# Patient Record
Sex: FEMALE | Race: BLACK OR AFRICAN AMERICAN | NOT HISPANIC OR LATINO | URBAN - METROPOLITAN AREA
[De-identification: names, ages, dates, MRNs, and addresses within clinical notes are randomized per-mention and may not be internally consistent; named-entity substitution may affect disease eponyms.]

---

## 2017-07-17 ENCOUNTER — OUTPATIENT (OUTPATIENT)
Dept: OUTPATIENT SERVICES | Facility: HOSPITAL | Age: 67
LOS: 1 days | End: 2017-07-17
Payer: MEDICARE

## 2017-07-17 PROCEDURE — 73600 X-RAY EXAM OF ANKLE: CPT | Mod: 26,50

## 2017-07-17 PROCEDURE — 73600 X-RAY EXAM OF ANKLE: CPT

## 2017-07-17 PROCEDURE — 73630 X-RAY EXAM OF FOOT: CPT

## 2017-07-17 PROCEDURE — 73630 X-RAY EXAM OF FOOT: CPT | Mod: 26,50

## 2017-09-08 ENCOUNTER — OUTPATIENT (OUTPATIENT)
Dept: OUTPATIENT SERVICES | Facility: HOSPITAL | Age: 67
LOS: 1 days | Discharge: HOME | End: 2017-09-08

## 2017-09-08 DIAGNOSIS — M25.774 OSTEOPHYTE, RIGHT FOOT: ICD-10-CM

## 2017-09-08 DIAGNOSIS — Z79.899 OTHER LONG TERM (CURRENT) DRUG THERAPY: ICD-10-CM

## 2017-09-08 DIAGNOSIS — E11.9 TYPE 2 DIABETES MELLITUS WITHOUT COMPLICATIONS: ICD-10-CM

## 2017-09-08 DIAGNOSIS — I10 ESSENTIAL (PRIMARY) HYPERTENSION: ICD-10-CM

## 2017-09-08 DIAGNOSIS — M21.6X1 OTHER ACQUIRED DEFORMITIES OF RIGHT FOOT: ICD-10-CM

## 2017-09-08 DIAGNOSIS — M76.61 ACHILLES TENDINITIS, RIGHT LEG: ICD-10-CM

## 2018-02-05 ENCOUNTER — APPOINTMENT (OUTPATIENT)
Dept: VASCULAR SURGERY | Facility: CLINIC | Age: 68
End: 2018-02-05
Payer: MEDICARE

## 2018-02-05 PROCEDURE — 93970 EXTREMITY STUDY: CPT

## 2018-05-18 ENCOUNTER — APPOINTMENT (OUTPATIENT)
Dept: VASCULAR SURGERY | Facility: CLINIC | Age: 68
End: 2018-05-18
Payer: MEDICARE

## 2018-05-18 VITALS
WEIGHT: 205 LBS | BODY MASS INDEX: 31.07 KG/M2 | SYSTOLIC BLOOD PRESSURE: 131 MMHG | HEART RATE: 59 BPM | OXYGEN SATURATION: 99 % | DIASTOLIC BLOOD PRESSURE: 72 MMHG | HEIGHT: 68 IN

## 2018-05-18 DIAGNOSIS — Q82.0 HEREDITARY LYMPHEDEMA: ICD-10-CM

## 2018-05-18 DIAGNOSIS — Z86.39 PERSONAL HISTORY OF OTHER ENDOCRINE, NUTRITIONAL AND METABOLIC DISEASE: ICD-10-CM

## 2018-05-18 DIAGNOSIS — Z86.79 PERSONAL HISTORY OF OTHER DISEASES OF THE CIRCULATORY SYSTEM: ICD-10-CM

## 2018-05-18 DIAGNOSIS — Z78.9 OTHER SPECIFIED HEALTH STATUS: ICD-10-CM

## 2018-05-18 DIAGNOSIS — R60.0 LOCALIZED EDEMA: ICD-10-CM

## 2018-05-18 PROCEDURE — 99203 OFFICE O/P NEW LOW 30 MIN: CPT

## 2018-05-18 PROCEDURE — 93880 EXTRACRANIAL BILAT STUDY: CPT

## 2018-06-01 PROBLEM — Q82.0 LYMPHEDEMA, HEREDITARY: Status: ACTIVE | Noted: 2018-06-01

## 2018-08-27 ENCOUNTER — OUTPATIENT (OUTPATIENT)
Dept: OUTPATIENT SERVICES | Facility: HOSPITAL | Age: 68
LOS: 1 days | End: 2018-08-27
Payer: MEDICARE

## 2018-08-27 ENCOUNTER — APPOINTMENT (OUTPATIENT)
Dept: VASCULAR SURGERY | Facility: CLINIC | Age: 68
End: 2018-08-27
Payer: MEDICARE

## 2018-08-27 PROCEDURE — 73564 X-RAY EXAM KNEE 4 OR MORE: CPT | Mod: 26,RT

## 2018-08-27 PROCEDURE — 73564 X-RAY EXAM KNEE 4 OR MORE: CPT

## 2018-08-27 PROCEDURE — 93971 EXTREMITY STUDY: CPT

## 2018-09-03 PROBLEM — R60.0 BILATERAL EDEMA OF LOWER EXTREMITY: Status: ACTIVE | Noted: 2018-05-18

## 2018-11-16 ENCOUNTER — APPOINTMENT (OUTPATIENT)
Dept: VASCULAR SURGERY | Facility: CLINIC | Age: 68
End: 2018-11-16
Payer: MEDICARE

## 2018-11-16 PROCEDURE — 99212 OFFICE O/P EST SF 10 MIN: CPT

## 2019-01-04 ENCOUNTER — APPOINTMENT (OUTPATIENT)
Dept: OBGYN | Facility: CLINIC | Age: 69
End: 2019-01-04
Payer: MEDICARE

## 2019-01-04 ENCOUNTER — RESULT REVIEW (OUTPATIENT)
Age: 69
End: 2019-01-04

## 2019-01-04 PROCEDURE — G0101: CPT

## 2019-05-07 ENCOUNTER — APPOINTMENT (OUTPATIENT)
Dept: VASCULAR SURGERY | Facility: CLINIC | Age: 69
End: 2019-05-07
Payer: MEDICARE

## 2019-05-07 ENCOUNTER — OUTPATIENT (OUTPATIENT)
Dept: OUTPATIENT SERVICES | Facility: HOSPITAL | Age: 69
LOS: 1 days | End: 2019-05-07
Payer: MEDICARE

## 2019-05-07 PROCEDURE — 93971 EXTREMITY STUDY: CPT

## 2019-05-07 PROCEDURE — 73564 X-RAY EXAM KNEE 4 OR MORE: CPT | Mod: 26,RT

## 2019-05-07 PROCEDURE — 73564 X-RAY EXAM KNEE 4 OR MORE: CPT

## 2019-06-17 ENCOUNTER — EMERGENCY (EMERGENCY)
Facility: HOSPITAL | Age: 69
LOS: 1 days | Discharge: HOME | End: 2019-06-17
Admitting: EMERGENCY MEDICINE
Payer: MEDICARE

## 2019-06-17 VITALS
SYSTOLIC BLOOD PRESSURE: 155 MMHG | OXYGEN SATURATION: 97 % | DIASTOLIC BLOOD PRESSURE: 77 MMHG | RESPIRATION RATE: 16 BRPM | TEMPERATURE: 98 F | HEART RATE: 78 BPM
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
## 2019-06-17 DIAGNOSIS — Z79.84 LONG TERM (CURRENT) USE OF ORAL HYPOGLYCEMIC DRUGS: ICD-10-CM
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
## 2019-06-17 DIAGNOSIS — E11.9 TYPE 2 DIABETES MELLITUS WITHOUT COMPLICATIONS: ICD-10-CM

## 2019-06-17 DIAGNOSIS — I10 ESSENTIAL (PRIMARY) HYPERTENSION: ICD-10-CM
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
## 2019-06-17 DIAGNOSIS — Z79.899 OTHER LONG TERM (CURRENT) DRUG THERAPY: ICD-10-CM
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
## 2019-06-17 DIAGNOSIS — S00.262A INSECT BITE (NONVENOMOUS) OF LEFT EYELID AND PERIOCULAR AREA, INITIAL ENCOUNTER: ICD-10-CM

## 2019-06-17 DIAGNOSIS — S80.861A INSECT BITE (NONVENOMOUS), RIGHT LOWER LEG, INITIAL ENCOUNTER: ICD-10-CM

## 2019-06-17 PROCEDURE — 99283 EMERGENCY DEPT VISIT LOW MDM: CPT

## 2019-06-17 RX ORDER — CEPHALEXIN 500 MG
1 CAPSULE ORAL
Qty: 20 | Refills: 0
Start: 2019-06-17 | End: 2019-06-26

## 2019-06-17 RX ORDER — FEXOFENADINE HCL 30 MG
1 TABLET ORAL
Qty: 10 | Refills: 0
Start: 2019-06-17

## 2019-06-17 NOTE — ED PROVIDER NOTE - OBJECTIVE STATEMENT
patient with Hx CAD, DM, HTN c/o insect bites after attending a barbeque 2 days ago; says she thinks they were mosquitos.  Affected areas left eyebrow, right lower leg.  Very itchy, not painful.  Left eyelid became swollen yesterday, but improving today.  Took benadryl last night with some relief.      PMHX DM HTN CAD  PSHx CABG

## 2019-06-17 NOTE — ED ADULT TRIAGE NOTE - CHIEF COMPLAINT QUOTE
Patient states "I was bitten by a mosquito on Saturday at a BBQ. Now my left eye and right leg is swollen."

## 2019-06-17 NOTE — ED ADULT NURSE NOTE - OBJECTIVE STATEMENT
Patient presents with swollen left eye lid and red mosquito bite to right shin. Patient states she was at a BBQ on Saturday and "I was bit by mosquitos." Patient tried using benadryl and states the swelling was not relieved.

## 2019-06-17 NOTE — ED PROVIDER NOTE - CLINICAL SUMMARY MEDICAL DECISION MAKING FREE TEXT BOX
67 yo fem DM, CAD c/o insect bites to left periorbital region and right lower leg after BBQ 2 days ago.  No change in vision or diplopia.  Mild left periorbital swelling: no pain with EOM, no warmth or significant tenderness; do not suspect orbital cellulitis.  Will Rx abx due to hx of DM.  Also patch of mild erythema right lateral lower leg without tenderness/warmth, she says this is also an insect bite.  Plan: PO abx and nonsedating antihistamine.  F/U Dr Sanchez in 1-2 days.

## 2019-06-17 NOTE — ED ADULT NURSE NOTE - PMH
Coronary artery disease involving other coronary artery bypass graft with angina pectoris    Diabetes type 2, controlled    HTN (hypertension), benign

## 2019-06-17 NOTE — ED PROVIDER NOTE - PHYSICAL EXAMINATION
GEN: alert NAD  HEENT: mild left periorbital puffiness, no redness/tenderness/warmth.  PERRL. EOMI without pain.  Conjunctiva clear.  AC clear.  Nasal mucosa clear.  Throat clear without redness or sswelling.  PULM: lungs CTAB  CV: RRR S1S2  MSK: No LE edema or calf tenderness  SKIN: patch of mild erythema 5 x 5 cm to right lateral calf.

## 2019-06-17 NOTE — ED PROVIDER NOTE - ENMT NEGATIVE STATEMENT, MLM
Nose: no nasal congestion and no nasal drainage. Mouth/Throat: no dysphagia, no hoarseness and no throat pain.

## 2019-06-17 NOTE — ED PROVIDER NOTE - NSFOLLOWUPINSTRUCTIONS_ED_ALL_ED_FT
Take Allegra (nonsedating antihistamine) and cephalexin (antibiotic) as prescribed.   Follow up  with DR Sanchez on 1-2 days.  Return to the Emergency Department if you have any new or worsening symptoms, or if you have any concerns.  ________________________________________    INSECT BITE OR STING - AfterCare(R) Instructions(ER/ED)     Insect Bite or Sting    WHAT YOU NEED TO KNOW:    Most insect bites and stings are not dangerous and go away without treatment. Your symptoms may be mild, or you may develop anaphylaxis. Anaphylaxis is a sudden, life-threatening reaction that needs immediate treatment. Common examples of insects that bite or sting are bees, ticks, mosquitoes, spiders, and ants. Insect bites or stings can lead to diseases such as malaria, West Nile virus, Lyme disease, or [place] Spotted Fever.    DISCHARGE INSTRUCTIONS:    Call 911 for signs or symptoms of anaphylaxis, such as trouble breathing, swelling in your mouth or throat, or wheezing. You may also have itching, a rash, hives, or feel like you are going to faint.    Return to the emergency department if:     You are stung on your tongue or in your throat.      A white area forms around the bite.      You are sweating badly or have body pain.      You think you were bitten or stung by a poisonous insect.    Contact your healthcare provider if:     You have a fever.      The area becomes red, warm, tender, and swollen beyond the area of the bite or sting.      You have questions or concerns about your condition or care.    Medicines:     Antihistamines decrease itching and rash.       Epinephrine is used to treat severe allergic reactions such as anaphylaxis.       Take your medicine as directed. Contact your healthcare provider if you think your medicine is not helping or if you have side effects. Tell him of her if you are allergic to any medicine. Keep a list of the medicines, vitamins, and herbs you take. Include the amounts, and when and why you take them. Bring the list or the pill bottles to follow-up visits. Carry your medicine list with you in case of an emergency.    Steps to take for signs or symptoms of anaphylaxis:     Immediately give 1 shot of epinephrine only into the outer thigh muscle.       Leave the shot in place as directed. Your healthcare provider may recommend you leave it in place for up to 10 seconds before you remove it. This helps make sure all of the epinephrine is delivered.       Call 911 and go to the emergency department, even if the shot improved symptoms. Do not drive yourself. Bring the used epinephrine shot with you.     Safety precautions to take if you are at risk for anaphylaxis:     Keep 2 shots of epinephrine with you at all times. You may need a second shot, because epinephrine only works for about 20 minutes and symptoms may return. Your healthcare provider can show you and family members how to give the shot. Check the expiration date every month and replace it before it expires.      Create an action plan. Your healthcare provider can help you create a written plan that explains the allergy and an emergency plan to treat a reaction. The plan explains when to give a second epinephrine shot if symptoms return or do not improve after the first. Give copies of the action plan and emergency instructions to family members, work and school staff, and  providers. Show them how to give a shot of epinephrine.      Carry medical alert identification. Wear medical alert jewelry or carry a card that says you have an insect allergy. Ask your healthcare provider where to get these items. Medical Alert Jewelry         If an insect bites or stings you:     Remove the stinger. Scrape the stinger out with your fingernail, edge of a credit card, or a knife blade. Do not squeeze the wound. Gently wash the area with soap and water.      Remove the tick. Ticks must be removed as soon as possible so you do not get diseases passed through tick bites. Ask your healthcare provider for more information on tick bites and how to remove ticks.    Care for a bite or sting wound:     Elevate the affected area. Prop the wound above the level of your heart, if possible. Elevate the area for 10 to 20 minutes each hour or as directed by your healthcare provider.      Use compresses. Soak a clean washcloth in cold water, wring it out, and put it on the bite or sting. Use the compress for 10 to 20 minutes each hour or as directed by your healthcare provider. After 24 to 48 hours, change to warm compresses.       Apply a paste. Add water to baking soda to make a thick paste. Put the paste on the area for 5 minutes. Rinse gently to remove the paste.     Prevent another insect bite or sting:     Do not wear bright-colored or flower-print clothing when you plan to spend time outdoors. Do not use hairspray, perfumes, or aftershave.      Do not leave food out.      Empty any standing water and wash container with soap and water every 2 days.      Put screens on all open windows and doors.      Put insect repellent that contains DEET on skin that is showing when you go outside. Put insect repellent at the top of your boots, bottom of pant legs, and sleeve cuffs. Wear long sleeves, pants, and shoes.      Use citronella candles outdoors to help keep mosquitoes away. Put a tick and flea collar on pets.    Follow up with your healthcare provider as directed: Write down your questions so you remember to ask them during your visits.

## 2019-12-26 ENCOUNTER — EMERGENCY (EMERGENCY)
Facility: HOSPITAL | Age: 69
LOS: 1 days | Discharge: HOME | End: 2019-12-26
Attending: EMERGENCY MEDICINE | Admitting: EMERGENCY MEDICINE
Payer: MEDICARE

## 2019-12-26 VITALS
TEMPERATURE: 97 F | HEART RATE: 59 BPM | RESPIRATION RATE: 18 BRPM | SYSTOLIC BLOOD PRESSURE: 149 MMHG | DIASTOLIC BLOOD PRESSURE: 89 MMHG | OXYGEN SATURATION: 100 %

## 2019-12-26 VITALS
TEMPERATURE: 98 F | DIASTOLIC BLOOD PRESSURE: 68 MMHG | OXYGEN SATURATION: 99 % | RESPIRATION RATE: 18 BRPM | SYSTOLIC BLOOD PRESSURE: 126 MMHG | HEART RATE: 60 BPM

## 2019-12-26 DIAGNOSIS — M54.6 PAIN IN THORACIC SPINE: ICD-10-CM

## 2019-12-26 DIAGNOSIS — I10 ESSENTIAL (PRIMARY) HYPERTENSION: ICD-10-CM

## 2019-12-26 DIAGNOSIS — M54.9 DORSALGIA, UNSPECIFIED: ICD-10-CM

## 2019-12-26 DIAGNOSIS — Z79.899 OTHER LONG TERM (CURRENT) DRUG THERAPY: ICD-10-CM

## 2019-12-26 LAB
ALBUMIN SERPL ELPH-MCNC: 4.1 G/DL — SIGNIFICANT CHANGE UP (ref 3.3–5)
ALP SERPL-CCNC: SIGNIFICANT CHANGE UP U/L (ref 40–120)
ALT FLD-CCNC: SIGNIFICANT CHANGE UP U/L (ref 10–45)
ANION GAP SERPL CALC-SCNC: 10 MMOL/L — SIGNIFICANT CHANGE UP (ref 5–17)
AST SERPL-CCNC: SIGNIFICANT CHANGE UP U/L (ref 10–40)
BILIRUB SERPL-MCNC: 0.5 MG/DL — SIGNIFICANT CHANGE UP (ref 0.2–1.2)
BUN SERPL-MCNC: 16 MG/DL — SIGNIFICANT CHANGE UP (ref 7–23)
CALCIUM SERPL-MCNC: 9.9 MG/DL — SIGNIFICANT CHANGE UP (ref 8.4–10.5)
CHLORIDE SERPL-SCNC: 105 MMOL/L — SIGNIFICANT CHANGE UP (ref 96–108)
CO2 SERPL-SCNC: 25 MMOL/L — SIGNIFICANT CHANGE UP (ref 22–31)
CREAT SERPL-MCNC: 0.84 MG/DL — SIGNIFICANT CHANGE UP (ref 0.5–1.3)
GLUCOSE SERPL-MCNC: 92 MG/DL — SIGNIFICANT CHANGE UP (ref 70–99)
HCT VFR BLD CALC: 42.5 % — SIGNIFICANT CHANGE UP (ref 34.5–45)
HGB BLD-MCNC: 13.1 G/DL — SIGNIFICANT CHANGE UP (ref 11.5–15.5)
MCHC RBC-ENTMCNC: 28.4 PG — SIGNIFICANT CHANGE UP (ref 27–34)
MCHC RBC-ENTMCNC: 30.8 GM/DL — LOW (ref 32–36)
MCV RBC AUTO: 92 FL — SIGNIFICANT CHANGE UP (ref 80–100)
NRBC # BLD: 0 /100 WBCS — SIGNIFICANT CHANGE UP (ref 0–0)
PLATELET # BLD AUTO: 186 K/UL — SIGNIFICANT CHANGE UP (ref 150–400)
POTASSIUM SERPL-MCNC: SIGNIFICANT CHANGE UP MMOL/L (ref 3.5–5.3)
POTASSIUM SERPL-SCNC: SIGNIFICANT CHANGE UP MMOL/L (ref 3.5–5.3)
PROT SERPL-MCNC: 8.1 G/DL — SIGNIFICANT CHANGE UP (ref 6–8.3)
RBC # BLD: 4.62 M/UL — SIGNIFICANT CHANGE UP (ref 3.8–5.2)
RBC # FLD: 14.2 % — SIGNIFICANT CHANGE UP (ref 10.3–14.5)
SODIUM SERPL-SCNC: 140 MMOL/L — SIGNIFICANT CHANGE UP (ref 135–145)
WBC # BLD: 6.21 K/UL — SIGNIFICANT CHANGE UP (ref 3.8–10.5)
WBC # FLD AUTO: 6.21 K/UL — SIGNIFICANT CHANGE UP (ref 3.8–10.5)

## 2019-12-26 PROCEDURE — 80053 COMPREHEN METABOLIC PANEL: CPT

## 2019-12-26 PROCEDURE — 74177 CT ABD & PELVIS W/CONTRAST: CPT

## 2019-12-26 PROCEDURE — 99284 EMERGENCY DEPT VISIT MOD MDM: CPT

## 2019-12-26 PROCEDURE — 96376 TX/PRO/DX INJ SAME DRUG ADON: CPT

## 2019-12-26 PROCEDURE — 96374 THER/PROPH/DIAG INJ IV PUSH: CPT | Mod: XU

## 2019-12-26 PROCEDURE — 74177 CT ABD & PELVIS W/CONTRAST: CPT | Mod: 26

## 2019-12-26 PROCEDURE — 85027 COMPLETE CBC AUTOMATED: CPT

## 2019-12-26 PROCEDURE — 36415 COLL VENOUS BLD VENIPUNCTURE: CPT

## 2019-12-26 PROCEDURE — 99284 EMERGENCY DEPT VISIT MOD MDM: CPT | Mod: 25

## 2019-12-26 RX ORDER — DIAZEPAM 5 MG
1 TABLET ORAL
Qty: 8 | Refills: 0
Start: 2019-12-26

## 2019-12-26 RX ORDER — KETOROLAC TROMETHAMINE 30 MG/ML
15 SYRINGE (ML) INJECTION ONCE
Refills: 0 | Status: DISCONTINUED | OUTPATIENT
Start: 2019-12-26 | End: 2019-12-26

## 2019-12-26 RX ORDER — ACETAMINOPHEN 500 MG
2 TABLET ORAL
Qty: 30 | Refills: 0
Start: 2019-12-26

## 2019-12-26 RX ORDER — DIAZEPAM 5 MG
5 TABLET ORAL ONCE
Refills: 0 | Status: DISCONTINUED | OUTPATIENT
Start: 2019-12-26 | End: 2019-12-26

## 2019-12-26 RX ADMIN — Medication 5 MILLIGRAM(S): at 17:49

## 2019-12-26 RX ADMIN — Medication 15 MILLIGRAM(S): at 15:30

## 2019-12-26 RX ADMIN — Medication 15 MILLIGRAM(S): at 18:04

## 2019-12-26 RX ADMIN — Medication 15 MILLIGRAM(S): at 15:15

## 2019-12-26 RX ADMIN — Medication 15 MILLIGRAM(S): at 17:49

## 2019-12-26 NOTE — ED PROVIDER NOTE - CLINICAL SUMMARY MEDICAL DECISION MAKING FREE TEXT BOX
69y F with right sided back pain radiating to abdomen. Given her age plan to obtain labs, CT and reassess.

## 2019-12-26 NOTE — ED PROVIDER NOTE - NSFOLLOWUPINSTRUCTIONS_ED_ALL_ED_FT
Back Exercises  The following exercises strengthen the muscles that help to support the back. They also help to keep the lower back flexible. Doing these exercises can help to prevent back pain or lessen existing pain.  If you have back pain or discomfort, try doing these exercises 2–3 times each day or as told by your health care provider. When the pain goes away, do them once each day, but increase the number of times that you repeat the steps for each exercise (do more repetitions). If you do not have back pain or discomfort, do these exercises once each day or as told by your health care provider.  Exercises  Single Knee to Chest     Repeat these steps 3–5 times for each leg:  Lie on your back on a firm bed or the floor with your legs extended.  Bring one knee to your chest. Your other leg should stay extended and in contact with the floor.  Hold your knee in place by grabbing your knee or thigh.  Pull on your knee until you feel a gentle stretch in your lower back.  Hold the stretch for 10–30 seconds.  Slowly release and straighten your leg.  Pelvic Tilt    Repeat these steps 5–10 times:  Lie on your back on a firm bed or the floor with your legs extended.  Bend your knees so they are pointing toward the ceiling and your feet are flat on the floor.  Tighten your lower abdominal muscles to press your lower back against the floor. This motion will tilt your pelvis so your tailbone points up toward the ceiling instead of pointing to your feet or the floor.  With gentle tension and even breathing, hold this position for 5–10 seconds.  Cat-Cow    ImageRepeat these steps until your lower back becomes more flexible:  Get into a hands-and-knees position on a firm surface. Keep your hands under your shoulders, and keep your knees under your hips. You may place padding under your knees for comfort.  Let your head hang down, and point your tailbone toward the floor so your lower back becomes rounded like the back of a cat.  Hold this position for 5 seconds.  Slowly lift your head and point your tailbone up toward the ceiling so your back forms a sagging arch like the back of a cow.  Hold this position for 5 seconds.  Press-Ups    ImageRepeat these steps 5–10 times:  Lie on your abdomen (face-down) on the floor.  Place your palms near your head, about shoulder-width apart.  While you keep your back as relaxed as possible and keep your hips on the floor, slowly straighten your arms to raise the top half of your body and lift your shoulders. Do not use your back muscles to raise your upper torso. You may adjust the placement of your hands to make yourself more comfortable.  Hold this position for 5 seconds while you keep your back relaxed.  Slowly return to lying flat on the floor.  Bridges    ImageRepeat these steps 10 times:  Lie on your back on a firm surface.  Bend your knees so they are pointing toward the ceiling and your feet are flat on the floor.  Tighten your buttocks muscles and lift your buttocks off of the floor until your waist is at almost the same height as your knees. You should feel the muscles working in your buttocks and the back of your thighs. If you do not feel these muscles, slide your feet 1–2 inches farther away from your buttocks.  Hold this position for 3–5 seconds.  Slowly lower your hips to the starting position, and allow your buttocks muscles to relax completely.  If this exercise is too easy, try doing it with your arms crossed over your chest.  Abdominal Crunches    Repeat these steps 5–10 times:  Lie on your back on a firm bed or the floor with your legs extended.  Bend your knees so they are pointing toward the ceiling and your feet are flat on the floor.  Cross your arms over your chest.  Tip your chin slightly toward your chest without bending your neck.  Tighten your abdominal muscles and slowly raise your trunk (torso) high enough to lift your shoulder blades a tiny bit off of the floor. Avoid raising your torso higher than that, because it can put too much stress on your low back and it does not help to strengthen your abdominal muscles.  Slowly return to your starting position.  Back Lifts    Repeat these steps 5–10 times:  Lie on your abdomen (face-down) with your arms at your sides, and rest your forehead on the floor.  Tighten the muscles in your legs and your buttocks.  Slowly lift your chest off of the floor while you keep your hips pressed to the floor. Keep the back of your head in line with the curve in your back. Your eyes should be looking at the floor.  Hold this position for 3–5 seconds.  Slowly return to your starting position.  Contact a health care provider if:  Your back pain or discomfort gets much worse when you do an exercise.Your back pain or discomfort does not lessen within 2 hours after you exercise.If you have any of these problems, stop doing these exercises right away. Do not do them again unless your health care provider says that you can.  Get help right away if:  You develop sudden, severe back pain. If this happens, stop doing the exercises right away. Do not do them again unless your health care provider says that you can.This information is not intended to replace advice given to you by your health care provider. Make sure you discuss any questions you have with your health care provider.    Back Pain    WHAT YOU NEED TO KNOW:    What do I need to know about back pain? Back pain is common. You may feel sore or stiff on one or both sides of your back. The pain may spread to your buttocks or thighs.    What causes or increases my risk for back pain? Conditions that affect the spine, joints, or muscles can cause back pain. These may include arthritis, spinal stenosis (narrowing of the spinal column), muscle tension, or breakdown of the spinal discs. The following increase your risk of back pain:     Repeated bending, lifting, or twisting, or lifting heavy items      Injury from a fall or accident      Lack of regular physical activity       Obesity or pregnancy       Smoking      Aging      Driving, sitting, or standing for long periods      Bad posture while sitting or standing    How is back pain diagnosed? Your healthcare provider will ask if you have any medical conditions. He or she may ask if you have a history of back pain and how it started. He or she may watch you stand and walk, and check your range of motion. Show him or her where you feel pain and what makes it better or worse. Describe the pain, how bad it is, and how long it lasts. Tell your provider if your pain worsens at night or when you lie on your back.    How is back pain treated?    NSAIDs help decrease swelling and pain. This medicine is available with or without a doctor's order. NSAIDs can cause stomach bleeding or kidney problems in certain people. If you take blood thinner medicine, always ask your healthcare provider if NSAIDs are safe for you. Always read the medicine label and follow directions.      Acetaminophen decreases pain and fever. It is available without a doctor's order. Ask how much to take and how often to take it. Follow directions. Read the labels of all other medicines you are using to see if they also contain acetaminophen, or ask your doctor or pharmacist. Acetaminophen can cause liver damage if not taken correctly. Do not use more than 4 grams (4,000 milligrams) total of acetaminophen in one day.       Muscle relaxers help decrease muscle spasms and back pain.      Prescription pain medicine may be given. Ask your healthcare provider how to take this medicine safely. Some prescription pain medicines contain acetaminophen. Do not take other medicines that contain acetaminophen without talking to your healthcare provider. Too much acetaminophen may cause liver damage. Prescription pain medicine may cause constipation. Ask your healthcare provider how to prevent or treat constipation.     How do I manage my back pain?     Apply ice on your back for 15 to 20 minutes every hour or as directed. Use an ice pack, or put crushed ice in a plastic bag. Cover it with a towel before you apply it to your skin. Ice helps prevent tissue damage and decreases pain.      Apply heat on your back for 20 to 30 minutes every 2 hours for as many days as directed. Heat helps decrease pain and muscle spasms.      Stay active as much as you can without causing more pain. Bed rest could make your back pain worse. Avoid heavy lifting until your pain is gone.      Go to physical therapy as directed. A physical therapist can teach you exercises to help improve movement and strength, and to decrease pain.    When should I seek immediate care?     You have pain, numbness, or weakness in one or both legs.      Your pain becomes so severe that you cannot walk.      You cannot control your urine or bowel movements.      You have severe back pain with chest pain.      You have severe back pain, nausea, and vomiting.      You have severe back pain that spreads to your side or genital area.    When should I contact my healthcare provider?     You have back pain that does not get better with rest and pain medicine.      You have a fever.      You have pain that worsens when you are on your back or when you rest.      You have pain that worsens when you cough or sneeze.      You lose weight without trying.      You have questions or concerns about your condition or care.    CARE AGREEMENT:    You have the right to help plan your care. Learn about your health condition and how it may be treated. Discuss treatment options with your healthcare providers to decide what care you want to receive. You always have the right to refuse treatment.        © Copyright [employer] 2019       back to top                      © Copyright [employer] 2019

## 2019-12-26 NOTE — ED ADULT TRIAGE NOTE - CHIEF COMPLAINT QUOTE
Patient complaining of right sided back pain, with radiation to abdomen for two weeks.  Patient denies any falls, injury, urinary symptoms, N/V/D, SOB or any other complaints at this time.

## 2019-12-26 NOTE — ED PROVIDER NOTE - OBJECTIVE STATEMENT
69y F with a history of HTN and triple bypass presents to the ED with complains of right sided back pain radiating to the abdomen 1x week. Patient notes taking Baclofen. Denies shortness of breath, fever, chills, dysuria, hematuria, trauma, leg weakness, or numbness. Patient is concerned symptoms are related to cancer but no family history of cancer.

## 2019-12-26 NOTE — ED ADULT NURSE NOTE - INTERVENTIONS DEFINITIONS
Stretcher in lowest position, wheels locked, appropriate side rails in place/Instruct patient to call for assistance/Monitor gait and stability/Physically safe environment: no spills, clutter or unnecessary equipment

## 2019-12-26 NOTE — ED PROVIDER NOTE - PATIENT PORTAL LINK FT
You can access the FollowMyHealth Patient Portal offered by [facility] by registering at the following website: [email or web address]. By joining [employer]’s FollowMyHealth portal, you will also be able to view your health information using other applications (apps) compatible with our system.

## 2019-12-26 NOTE — ED ADULT NURSE NOTE - OBJECTIVE STATEMENT
Pt. c/o L middle back pain radiating to epigastric x 1-2 weeks. Pt. reports L middle back pain on/off for "awhile" but has been constant x 2 weeks and epigastric pain constant x 1 week. Pain worsens w/ movement, last took baclofen last night. Denies abd pain, N&V&D, urinary Sx. Denies fever, chills, body aches. Denies recent injury/trauma to area, numbness/tingling in b/l LE. B/l ankle edema at baseline noted.

## 2019-12-26 NOTE — ED PROVIDER NOTE - MUSCULOSKELETAL, MLM
Spine appears normal, range of motion is not limited, no muscle or joint tenderness, reproducible pain right sided paraspinal region.

## 2019-12-26 NOTE — ED PROVIDER NOTE - PROGRESS NOTE DETAILS
improved after pain meds, potentially msk given improvement and no acute pathology on CT, no sob, cp, will rx meds on dc, fu with pmd, return for any worsening sx.

## 2020-01-15 ENCOUNTER — OUTPATIENT (OUTPATIENT)
Dept: OUTPATIENT SERVICES | Facility: HOSPITAL | Age: 70
LOS: 1 days | End: 2020-01-15
Payer: MEDICARE

## 2020-01-15 PROCEDURE — 78306 BONE IMAGING WHOLE BODY: CPT | Mod: 26

## 2020-01-15 PROCEDURE — 78830 RP LOCLZJ TUM SPECT W/CT 1: CPT | Mod: 26

## 2020-01-15 PROCEDURE — 78306 BONE IMAGING WHOLE BODY: CPT

## 2020-01-15 PROCEDURE — A9503: CPT

## 2020-01-21 ENCOUNTER — APPOINTMENT (OUTPATIENT)
Dept: MRI IMAGING | Facility: HOSPITAL | Age: 70
End: 2020-01-21

## 2020-01-21 ENCOUNTER — OUTPATIENT (OUTPATIENT)
Dept: OUTPATIENT SERVICES | Facility: HOSPITAL | Age: 70
LOS: 1 days | End: 2020-01-21
Payer: MEDICARE

## 2020-01-21 DIAGNOSIS — G95.9 DISEASE OF SPINAL CORD, UNSPECIFIED: ICD-10-CM

## 2020-01-21 PROCEDURE — 78830 RP LOCLZJ TUM SPECT W/CT 1: CPT

## 2020-01-21 PROCEDURE — 72148 MRI LUMBAR SPINE W/O DYE: CPT | Mod: 26

## 2020-01-21 PROCEDURE — 72148 MRI LUMBAR SPINE W/O DYE: CPT

## 2020-02-18 ENCOUNTER — APPOINTMENT (OUTPATIENT)
Dept: VASCULAR SURGERY | Facility: CLINIC | Age: 70
End: 2020-02-18
Payer: MEDICARE

## 2020-02-18 ENCOUNTER — OUTPATIENT (OUTPATIENT)
Dept: OUTPATIENT SERVICES | Facility: HOSPITAL | Age: 70
LOS: 1 days | End: 2020-02-18
Payer: MEDICARE

## 2020-02-18 ENCOUNTER — APPOINTMENT (OUTPATIENT)
Dept: MRI IMAGING | Facility: HOSPITAL | Age: 70
End: 2020-02-18
Payer: MEDICARE

## 2020-02-18 PROCEDURE — 72146 MRI CHEST SPINE W/O DYE: CPT | Mod: 26

## 2020-02-18 PROCEDURE — 72146 MRI CHEST SPINE W/O DYE: CPT

## 2020-02-18 PROCEDURE — 73502 X-RAY EXAM HIP UNI 2-3 VIEWS: CPT | Mod: 26,RT

## 2020-02-18 PROCEDURE — 73502 X-RAY EXAM HIP UNI 2-3 VIEWS: CPT

## 2020-02-18 PROCEDURE — 93971 EXTREMITY STUDY: CPT

## 2020-07-31 ENCOUNTER — APPOINTMENT (OUTPATIENT)
Dept: OBGYN | Facility: CLINIC | Age: 70
End: 2020-07-31
Payer: MEDICARE

## 2020-07-31 VITALS
SYSTOLIC BLOOD PRESSURE: 120 MMHG | DIASTOLIC BLOOD PRESSURE: 80 MMHG | BODY MASS INDEX: 29.1 KG/M2 | HEIGHT: 68 IN | WEIGHT: 192 LBS

## 2020-07-31 DIAGNOSIS — Z86.39 PERSONAL HISTORY OF OTHER ENDOCRINE, NUTRITIONAL AND METABOLIC DISEASE: ICD-10-CM

## 2020-07-31 PROCEDURE — G0101: CPT

## 2020-07-31 PROCEDURE — 99397 PER PM REEVAL EST PAT 65+ YR: CPT | Mod: GY

## 2020-07-31 PROCEDURE — 99212 OFFICE O/P EST SF 10 MIN: CPT | Mod: 25

## 2020-07-31 RX ORDER — NYSTATIN AND TRIAMCINOLONE ACETONIDE 100000; 1 MG/G; MG/G
100000-0.1 CREAM TOPICAL 3 TIMES DAILY
Qty: 42 | Refills: 0 | Status: ACTIVE | COMMUNITY
Start: 2020-07-31 | End: 1900-01-01

## 2020-07-31 RX ORDER — LOSARTAN POTASSIUM 100 MG/1
100 TABLET, FILM COATED ORAL
Refills: 0 | Status: ACTIVE | COMMUNITY

## 2020-07-31 RX ORDER — FUROSEMIDE 40 MG/1
40 TABLET ORAL
Refills: 0 | Status: ACTIVE | COMMUNITY

## 2020-07-31 RX ORDER — CLONIDINE HYDROCHLORIDE 0.3 MG/1
TABLET ORAL
Refills: 0 | Status: ACTIVE | COMMUNITY

## 2020-07-31 RX ORDER — ATORVASTATIN CALCIUM 40 MG/1
40 TABLET, FILM COATED ORAL
Refills: 0 | Status: ACTIVE | COMMUNITY

## 2020-07-31 RX ORDER — CANAGLIFLOZIN 100 MG/1
100 TABLET, FILM COATED ORAL
Refills: 0 | Status: ACTIVE | COMMUNITY

## 2020-07-31 RX ORDER — NIFEDIPINE 90 MG
90 TABLET, EXTENDED RELEASE ORAL
Refills: 0 | Status: ACTIVE | COMMUNITY

## 2020-07-31 RX ORDER — METFORMIN HYDROCHLORIDE 500 MG/1
500 TABLET, COATED ORAL
Refills: 0 | Status: ACTIVE | COMMUNITY

## 2020-07-31 RX ORDER — MULTIVIT-MIN/FA/LYCOPEN/LUTEIN .4-300-25
TABLET ORAL
Refills: 0 | Status: ACTIVE | COMMUNITY

## 2020-08-03 RX ORDER — CLOTRIMAZOLE AND BETAMETHASONE DIPROPIONATE 10; .5 MG/G; MG/G
1-0.05 CREAM TOPICAL 3 TIMES DAILY
Qty: 1 | Refills: 1 | Status: ACTIVE | COMMUNITY
Start: 2020-08-03 | End: 1900-01-01

## 2020-08-13 LAB — CYTOLOGY CVX/VAG DOC THIN PREP: NORMAL

## 2021-06-17 DIAGNOSIS — N63.0 UNSPECIFIED LUMP IN UNSPECIFIED BREAST: ICD-10-CM

## 2021-08-13 ENCOUNTER — NON-APPOINTMENT (OUTPATIENT)
Age: 71
End: 2021-08-13

## 2021-08-13 ENCOUNTER — APPOINTMENT (OUTPATIENT)
Dept: OBGYN | Facility: CLINIC | Age: 71
End: 2021-08-13
Payer: MEDICARE

## 2021-08-13 VITALS
SYSTOLIC BLOOD PRESSURE: 118 MMHG | HEIGHT: 68 IN | DIASTOLIC BLOOD PRESSURE: 75 MMHG | BODY MASS INDEX: 25.01 KG/M2 | WEIGHT: 165 LBS

## 2021-08-13 DIAGNOSIS — Z00.00 ENCOUNTER FOR GENERAL ADULT MEDICAL EXAMINATION W/OUT ABNORMAL FINDINGS: ICD-10-CM

## 2021-08-13 PROCEDURE — G0101: CPT

## 2021-08-13 NOTE — HISTORY OF PRESENT ILLNESS
[FreeTextEntry1] : SALIMA NAIR IS A 70 year OLD WHO PRESENTS FOR AN ANNUAL GYN EXAM.  SHE IS WITHOUT COMPLAINTS.\par

## 2021-08-13 NOTE — PHYSICAL EXAM
[Appropriately responsive] : appropriately responsive [Alert] : alert [No Acute Distress] : no acute distress [No Lymphadenopathy] : no lymphadenopathy [Regular Rate Rhythm] : regular rate rhythm [No Murmurs] : no murmurs [Clear to Auscultation B/L] : clear to auscultation bilaterally [Soft] : soft [Non-tender] : non-tender [Non-distended] : non-distended [No HSM] : No HSM [No Lesions] : no lesions [No Mass] : no mass [Oriented x3] : oriented x3 [Examination Of The Breasts] : a normal appearance [No Masses] : no breast masses were palpable [Labia Majora] : normal [Labia Minora] : normal [Atrophy] : atrophy [Normal] : normal [Absent] : absent [Uterine Adnexae] : normal [No Tenderness] : no tenderness [Nl Sphincter Tone] : normal sphincter tone [FreeTextEntry7] : OBESE; WELL HEALED SCAR

## 2021-11-30 ENCOUNTER — EMERGENCY (EMERGENCY)
Facility: HOSPITAL | Age: 71
LOS: 1 days | Discharge: HOME | End: 2021-11-30
Admitting: EMERGENCY MEDICINE
Payer: MEDICARE

## 2021-11-30 VITALS
RESPIRATION RATE: 16 BRPM | DIASTOLIC BLOOD PRESSURE: 78 MMHG | HEIGHT: 68 IN | WEIGHT: 182.1 LBS | SYSTOLIC BLOOD PRESSURE: 168 MMHG | HEART RATE: 58 BPM | TEMPERATURE: 98 F | OXYGEN SATURATION: 94 %

## 2021-11-30 DIAGNOSIS — I10 ESSENTIAL (PRIMARY) HYPERTENSION: ICD-10-CM

## 2021-11-30 DIAGNOSIS — R07.89 OTHER CHEST PAIN: ICD-10-CM

## 2021-11-30 DIAGNOSIS — Z98.890 OTHER SPECIFIED POSTPROCEDURAL STATES: Chronic | ICD-10-CM

## 2021-11-30 DIAGNOSIS — Z79.82 LONG TERM (CURRENT) USE OF ASPIRIN: ICD-10-CM

## 2021-11-30 DIAGNOSIS — Z79.84 LONG TERM (CURRENT) USE OF ORAL HYPOGLYCEMIC DRUGS: ICD-10-CM

## 2021-11-30 DIAGNOSIS — Z88.6 ALLERGY STATUS TO ANALGESIC AGENT: ICD-10-CM

## 2021-11-30 DIAGNOSIS — R00.1 BRADYCARDIA, UNSPECIFIED: ICD-10-CM

## 2021-11-30 DIAGNOSIS — I25.10 ATHEROSCLEROTIC HEART DISEASE OF NATIVE CORONARY ARTERY WITHOUT ANGINA PECTORIS: ICD-10-CM

## 2021-11-30 DIAGNOSIS — M25.512 PAIN IN LEFT SHOULDER: ICD-10-CM

## 2021-11-30 DIAGNOSIS — Z91.013 ALLERGY TO SEAFOOD: ICD-10-CM

## 2021-11-30 DIAGNOSIS — Z88.8 ALLERGY STATUS TO OTHER DRUGS, MEDICAMENTS AND BIOLOGICAL SUBSTANCES STATUS: ICD-10-CM

## 2021-11-30 DIAGNOSIS — Z95.1 PRESENCE OF AORTOCORONARY BYPASS GRAFT: Chronic | ICD-10-CM

## 2021-11-30 DIAGNOSIS — E11.9 TYPE 2 DIABETES MELLITUS WITHOUT COMPLICATIONS: ICD-10-CM

## 2021-11-30 PROCEDURE — 93005 ELECTROCARDIOGRAM TRACING: CPT

## 2021-11-30 PROCEDURE — 73030 X-RAY EXAM OF SHOULDER: CPT | Mod: 26

## 2021-11-30 PROCEDURE — 71046 X-RAY EXAM CHEST 2 VIEWS: CPT | Mod: 26

## 2021-11-30 PROCEDURE — 71046 X-RAY EXAM CHEST 2 VIEWS: CPT

## 2021-11-30 PROCEDURE — 99284 EMERGENCY DEPT VISIT MOD MDM: CPT

## 2021-11-30 PROCEDURE — 73030 X-RAY EXAM OF SHOULDER: CPT

## 2021-11-30 PROCEDURE — 99284 EMERGENCY DEPT VISIT MOD MDM: CPT | Mod: 25

## 2021-11-30 PROCEDURE — 93010 ELECTROCARDIOGRAM REPORT: CPT

## 2021-11-30 RX ORDER — IBUPROFEN 200 MG
600 TABLET ORAL ONCE
Refills: 0 | Status: COMPLETED | OUTPATIENT
Start: 2021-11-30 | End: 2021-11-30

## 2021-11-30 RX ORDER — CYCLOBENZAPRINE HYDROCHLORIDE 10 MG/1
10 TABLET, FILM COATED ORAL ONCE
Refills: 0 | Status: COMPLETED | OUTPATIENT
Start: 2021-11-30 | End: 2021-11-30

## 2021-11-30 RX ORDER — CYCLOBENZAPRINE HYDROCHLORIDE 10 MG/1
1 TABLET, FILM COATED ORAL
Qty: 15 | Refills: 0
Start: 2021-11-30

## 2021-11-30 RX ADMIN — Medication 600 MILLIGRAM(S): at 11:25

## 2021-11-30 RX ADMIN — CYCLOBENZAPRINE HYDROCHLORIDE 10 MILLIGRAM(S): 10 TABLET, FILM COATED ORAL at 11:25

## 2021-11-30 NOTE — ED ADULT NURSE NOTE - CHIEF COMPLAINT
The patient is a 71y Female complaining of shoulder pain/injury. PT REPORTS LEFT SHOULDER PAIN THAT STARTED YESTERDAY WORSE WITH MOVEMENT. DENIES CHEST PAIN OR SOB AT THIS TIME.

## 2021-11-30 NOTE — ED ADULT TRIAGE NOTE - CHIEF COMPLAINT QUOTE
Pt reports left shoulder pain worse with movement since Sunday, denies injuries/falls. Reports chest pain "only when I moved my left arm yesterday." Denies numbness/tingling, no sob, n/v, dizziness, chest pain.

## 2021-11-30 NOTE — ED PROVIDER NOTE - PATIENT PORTAL LINK FT
You can access the FollowMyHealth Patient Portal offered by [facility] by registering at the following website: [email or web address]. By joining [employer]’s FollowMyHealth portal, you will also be able to view your health information using other applications (apps) compatible with our system.

## 2021-11-30 NOTE — ED PROVIDER NOTE - EKG ADDITIONAL QUESTION - PERFORMED INDEPENDENT VISUALIZATION
Contacted patient,discussed need to cancel  APNP appointment this afternoon.pt states that \" works out better for me.\" will keep appointment with  on 5-20-19.   there is no prior EKG available for comparison Yes

## 2021-11-30 NOTE — ED PROVIDER NOTE - CARE PROVIDER_API CALL
Nacho Payan)  Orthopaedic Surgery  [address] [place] [address]  Phone: [phone]  Fax: [phone]  Follow Up Time:

## 2021-11-30 NOTE — ED PROVIDER NOTE - CLINICAL SUMMARY MEDICAL DECISION MAKING FREE TEXT BOX
70 y/o F PMHx HTN, triple vessel bypass 13 years ago, left shoulder surgery 20 years ago. Presenting today with left shoulder pain since 2 days ago w/ movement and ROM limited.   on exam hrrr, lungs ctab, no chest wall ttp, LUE: point tenderness over the anterior AC joint. limited active and passive ROM to shoulder secondary to pain. Passive ROM to approximately 30 degrees. FROM of elbow and wrist. Radial pulse 2+. SILT. Cap refill intact.  EKG no change from prior.   xray w/ L shoulder w/ calcified tendonitis, no fracture/dislocation.  improved with NSAID/msk relaxer.  will give sling, rx flexeril and outpt f/u with ortho. discussed strict return parameters

## 2021-11-30 NOTE — ED PROVIDER NOTE - NSICDXPASTMEDICALHX_GEN_ALL_CORE_FT
PAST MEDICAL HISTORY:  Coronary artery disease involving other coronary artery bypass graft with angina pectoris     Diabetes type 2, controlled     HTN (hypertension), benign

## 2021-11-30 NOTE — ED PROVIDER NOTE - OBJECTIVE STATEMENT
72 y/o F PMHx HTN, triple vessel bypass 13 years ago, left shoulder surgery 20 years ago. Presenting today with left shoulder pain since 2 days ago. Pt woke up 2 days ago in the morning with aching pain over the shoulder joint and some difficulty moving the shoulder due to severe pain. Reports similar pain in the past however usually resolved with OTC medication and within a day. Pt's pain is progressively worsening and she feels that she cannot move her shoulder. Denies pain to the elbow and wrist, numbness, and weakness. Took Tylenol last night without improvement to her pain. Last night, she felt pain radiating into the left lateral chest wall when trying to raise her arm and resolved with immobilization of the arm. Denies SOB, palpitations dizziness, fevers, chills, skin changes, swelling of the arm. URI symptoms, neck pain or stiffness, or back pain. No falls, injuries, or heavy lifting. Pt currently does not have pain in her chest, even with movement of the arm.

## 2021-11-30 NOTE — ED PROVIDER NOTE - PHYSICAL EXAMINATION
Vitals reviewed  Gen: nad, speaking in full sentences  Skin: wwp, no rash/lesions  HEENT: ncat, eomi, mmm  Neck: Supple. No midline tenderness.  CV: rrr, no audible m/r/g. No reproducible chest wall tenderness.   Resp: symmetrical expansion, ctab, no w/r/r  LUE: point tenderness over the anterior AC joint. limited active and passive ROM to shoulder secondary to pain. Passive ROM to approximately 30 degrees. FROM of elbow and wrist. Radial pulse 2+. SILT. Cap refill intact.   Ext: FROM of all other extremities. NVI.  Neuro: alert/oriented, no focal deficits, steady gait

## 2021-11-30 NOTE — ED PROVIDER NOTE - NSFOLLOWUPINSTRUCTIONS_ED_ALL_ED_FT
Take tylenol 650mg or motrin 600mg for pain every 4-6 hours.  You can also take flexeril (muscle relaxer) as prescribed for pain but do not drive/operate heavy machinery as it can make you drowsy    Wear sling for comfort and call to arrange follow up with orthopedic specialist within one week  You may call our referrals coordinator at [phone] Monday to Friday 11am-7pm for assistance with making an appointment      Tendinitis    WHAT YOU NEED TO KNOW:    Tendinitis is painful inflammation or breakdown of your tendons. It may also be called tendinopathy. Tendinitis often occurs in the knee, shoulder, ankle, hip, or elbow.    DISCHARGE INSTRUCTIONS:    Medicines:   •Pain medicines such as acetaminophen or NSAIDs may decrease swelling and pain or fever. These medicines are available without a doctor's order. Ask which medicine to take. Ask how much to take and when to take it. Follow directions. Acetaminophen and NSAIDs can cause liver or kidney damage if not taken correctly. If you take blood thinner medicine, always ask your healthcare provider if NSAIDs are safe for you. Always read the medicine label and follow the directions on it before using these medicine.       •Take your medicine as directed. Contact your healthcare provider if you think your medicine is not helping or if you have side effects. Tell him if you are allergic to any medicine. Keep a list of the medicines, vitamins, and herbs you take. Include the amounts, and when and why you take them. Bring the list or the pill bottles to follow-up visits. Carry your medicine list with you in case of an emergency.      Management:   •Rest your tendon as directed to help it heal. Ask your healthcare provider if you need to stop putting weight on your affected area.      •Ice helps decrease swelling and pain. Ice may also help prevent tissue damage. Use an ice pack, or put crushed ice in a plastic bag. Cover it with a towel and place it on the affected area for 10 to 15 minutes every hour or as directed.      •Support devices such as a cane, splint, shoe insert, or brace may help reduce your pain.      •Physical therapy may be ordered by your healthcare provider. This may be used to teach you exercises to help improve movement and strength, and to decrease pain. You may also learn how to improve your posture, and how to lift or exercise correctly.      Prevention:   •Stretch and warm up before you exercise.       •Exercise regularly to strengthen the muscles around your joint. Ease into an exercise routine for the first 3 weeks to prevent another injury. Ask your healthcare provider about the best exercise plan for you. Rest fully between activities.      •Use the right equipment for sports and exercise. Wear braces or tape around weak joints as directed.      Follow up with your healthcare provider as directed: Write down your questions so you remember to ask them during your visits.     Contact your healthcare provider if:   •You have increased pain even after you take medicine.      •You have questions or concerns about your condition or care.      Return to the emergency department if:   •You have increased redness over the joint, or swelling in the joint.      •You suddenly cannot move your joint.

## 2022-10-05 ENCOUNTER — APPOINTMENT (OUTPATIENT)
Dept: OBGYN | Facility: CLINIC | Age: 72
End: 2022-10-05

## 2022-10-05 VITALS
DIASTOLIC BLOOD PRESSURE: 68 MMHG | BODY MASS INDEX: 29.35 KG/M2 | WEIGHT: 193 LBS | HEART RATE: 63 BPM | OXYGEN SATURATION: 97 % | SYSTOLIC BLOOD PRESSURE: 135 MMHG

## 2022-10-05 DIAGNOSIS — Z90.710 ACQUIRED ABSENCE OF BOTH CERVIX AND UTERUS: ICD-10-CM

## 2022-10-05 DIAGNOSIS — N89.8 OTHER SPECIFIED NONINFLAMMATORY DISORDERS OF VAGINA: ICD-10-CM

## 2022-10-05 DIAGNOSIS — Z01.419 ENCOUNTER FOR GYNECOLOGICAL EXAMINATION (GENERAL) (ROUTINE) W/OUT ABNORMAL FINDINGS: ICD-10-CM

## 2022-10-05 PROCEDURE — G0101: CPT

## 2022-10-05 RX ORDER — NYSTATIN AND TRIAMCINOLONE ACETONIDE 100000; 1 [USP'U]/G; MG/G
100000-0.1 OINTMENT TOPICAL 3 TIMES DAILY
Qty: 7 | Refills: 1 | Status: ACTIVE | COMMUNITY
Start: 2022-10-05 | End: 1900-01-01

## 2022-10-05 NOTE — HISTORY OF PRESENT ILLNESS
[Patient reported mammogram was normal] : Patient reported mammogram was normal [Patient reported breast sonogram was normal] : Patient reported breast sonogram was normal [Patient reported PAP Smear was normal] : Patient reported PAP Smear was normal [Currently In Menopause] : currently in menopause [Post-Menopause, No Sxs] : post-menopausal, currently without symptoms [Previously active] : previously active [No] : No [Mammogramdate] : 8/24/2021 [BreastSonogramDate] : 8/24/2021 [PapSmeardate] : 7/31/2020 [FreeTextEntry1] : Total hysterectomy

## 2022-10-05 NOTE — PHYSICAL EXAM
[Appropriately responsive] : appropriately responsive [Alert] : alert [No Acute Distress] : no acute distress [No Lymphadenopathy] : no lymphadenopathy [Regular Rate Rhythm] : regular rate rhythm [No Murmurs] : no murmurs [Clear to Auscultation B/L] : clear to auscultation bilaterally [Soft] : soft [Non-tender] : non-tender [Non-distended] : non-distended [No HSM] : No HSM [No Lesions] : no lesions [No Mass] : no mass [Oriented x3] : oriented x3 [Examination Of The Breasts] : a normal appearance [No Masses] : no breast masses were palpable [Vulvitis] : vulvitis [Labia Majora] : normal [Labia Minora] : normal [Normal] : normal [Atrophy] : atrophy [Absent] : absent [Uterine Adnexae] : normal [Discharge] : a  ~M vaginal discharge was present [Moderate] : moderate [White] : white [Thin] : thin [FreeTextEntry7] : obese [FreeTextEntry1] : chaffing of the BL medial labia majora.  No lesions

## 2022-10-10 LAB
CANDIDA VAG CYTO: NOT DETECTED
CYTOLOGY CVX/VAG DOC THIN PREP: ABNORMAL
G VAGINALIS+PREV SP MTYP VAG QL MICRO: NOT DETECTED
T VAGINALIS VAG QL WET PREP: NOT DETECTED

## 2022-10-11 NOTE — ED ADULT NURSE NOTE - NS ED NURSE LEVEL OF CONSCIOUSNESS MENTAL STATUS
Case Management Assessment & Discharge Planning Note    Patient name Ana Stafford  Location [facility] 204/2 9191 Doug Fuentes MRN [id number]  : 1960 Date 10/11/2022       Current Admission Date: 10/10/2022  Current Admission Diagnosis:Alcohol withdrawal [facility])   Patient Active Problem List    Diagnosis Date Noted   • Nasal bone fracture 10/10/2022   • Stage 3 chronic kidney disease ([facility] ) 2022   • Fatty liver, alcoholic    • History of atrial fibrillation 10/25/2021   • Poor historian 10/24/2021   • Mild protein-calorie malnutrition ([facility] ) 2021   • Prostate cancer ([facility] ) 2021   • Acute on chronic kidney failure ([facility] )    • History of Atrial fibrillation ([facility] ) 2020   • Chronic heart failure with preserved ejection fraction ([facility] ) 2019   • Alcohol abuse 10/17/2019   • Hyponatremia 10/17/2019   • Cervical spinal stenosis 10/17/2019   • Thrombocytopenia ([facility] ) 2019   • Alcohol withdrawal ([facility] ) 2019   • History of prostate cancer 2019   • CAD (coronary artery disease) 2019   • Nicotine abuse 2019   • Hypomagnesemia 10/10/2018   • Depression, recurrent ([facility] ) 10/10/2018   • Hx of CABG 10/10/2018   • Dyslipidemia 10/10/2018   • COPD (chronic obstructive pulmonary disease) ([facility] ) 10/09/2018   • Type 2 diabetes mellitus ([address] ) 10/09/2018      LOS (days): 1  Geometric Mean LOS (GMLOS) (days): 2 80  Days to GMLOS:2 1     OBJECTIVE:    Risk of Unplanned Readmission Score: 17 77         Current admission status: Inpatient  Referral Reason: Drug/Alcohol Abuse    Preferred Pharmacy:   [address] [facility] [address]  [address] [address]  Phone: [phone] Fax: [phone]    Primary Care Provider: rBigid Sheppard MD    Primary Insurance: MEDICARE  Secondary Insurance:     ASSESSMENT:  [facility], [address] [place] [address] Representative - Son   Primary Phone: [phone] (Mobile)                              Patient Information  Admitted from[de-identified] Home  Mental Status: Alert  During Assessment patient was accompanied by: Not accompanied during assessment  Assessment information provided by[de-identified] Patient  Primary Caregiver: Self  Support Systems: Son  [place] of Residence: [facility] Nasrin Herron do you live in?: [address] [place] [address] Road entry access options   Select all that apply : Elevator  Type of Current Residence: Apartment  Floor Level: 5  Upon entering residence, is there a bedroom on the main floor (no further steps)?: Yes  Upon entering residence, is there a bathroom on the main floor (no further steps)?: Yes  In the last 12 months, was there a time when you were not able to pay the mortgage or rent on time?: No  In the last 12 months, how many places have you lived?: 1  In the last 12 months, was there a time when you did not have a steady place to sleep or slept in a shelter (including now)?: Yes  Homeless/housing insecurity resource given?: N/A  Living Arrangements: Lives Alone (Girlfriends stays with him at times)  Is patient a ?: No    Activities of Daily Living Prior to Admission  Functional Status: Independent  Completes ADLs independently?: Yes  Ambulates independently?: Yes  Does patient use assisted devices?: Yes  Assisted Devices (DME) used: Walker (At times when he is feeling very weak)  Does patient currently own DME?: Yes  What DME does the patient currently own?: Leonid Botello  Does patient have a history of Outpatient Therapy (PT/OT)?: No  Does the patient have a history of Short-Term Rehab?: Yes ([facility] [address] if [facility] or [facility])  Does patient have a history of HHC?: No  Does patient currently have [facility]?: No         Patient Information Continued  Income Source: SSI/SSD  Does patient have prescription coverage?: Yes  Within the past 12 months, you worried that your food would run out before you got the money to buy more : Never true  Within the past 12 months, the food you Awake/Alert bought just didn't last and you didn't have money to get more : Never true  Food insecurity resource given?: N/A  Does patient receive dialysis treatments?: No  Does patient have a history of substance abuse?: Yes  Historical substance use preference: Alcohol/ETOH  History of Withdrawal Symptoms: Seizures  Is patient currently in treatment for substance abuse?: Yes (Counseling through [facility])         [employer] of Transport to [facility][de-identified] [address]  In the past 12 months, has lack of transportation kept you from medical appointments or from getting medications?: No (Patient states his son assists when public transportation isn't and option)  In the past 12 months, has lack of transportation kept you from meetings, work, or from getting things needed for daily living?: No (Patient states his son or a friend assist when necessary if the bus isn't an option)  Was application for public transport provided?: N/A        DISCHARGE DETAILS:    Discharge planning discussed with[de-identified] patient  Freedom of Choice: Yes     CM contacted family/caregiver?: No- see comments (Patient declines out reach and can talk to them once his cell phone is charged )  Were Treatment Team discharge recommendations reviewed with patient/caregiver?: Yes  Did patient/caregiver verbalize understanding of patient care needs?: Yes  Were patient/caregiver advised of the risks associated with not following Treatment Team discharge recommendations?: Yes         [facility]         Is the patient interested in [address] at discharge?: No    DME Referral Provided  Referral made for DME?: No    Other Referral/Resources/Interventions Provided:  Interventions: None Indicated  Referral Comments: Cm met with patient at bedside to introduce self and role and discuss dcp  Patient states he is currently recieving OP counseling through [facility] and they just increased his visits to 3 days/wk   CM discussed possible IP ETOH rehab and patient declines stating he's doing okay with [facility] and has "too much medical stuff going on right now " CM reviewed possiblity of home care and patient declines stating he's doing fine and he doesn't need it  CM explained call would be made to family to provide update and answer questions but patient declined stating he can talk to them once his phone is charged  Primary CM Margie updated           Treatment Team Recommendation: Home, Substance Abuse Treatment  Discharge Destination Plan[de-identified] Home, Substance Abuse Treatment

## 2022-10-14 ENCOUNTER — EMERGENCY (EMERGENCY)
Facility: HOSPITAL | Age: 72
LOS: 1 days | Discharge: HOME | End: 2022-10-14
Attending: EMERGENCY MEDICINE | Admitting: EMERGENCY MEDICINE
Payer: MEDICARE

## 2022-10-14 VITALS
SYSTOLIC BLOOD PRESSURE: 158 MMHG | TEMPERATURE: 98 F | OXYGEN SATURATION: 98 % | RESPIRATION RATE: 17 BRPM | WEIGHT: 192.9 LBS | HEART RATE: 65 BPM | DIASTOLIC BLOOD PRESSURE: 69 MMHG | HEIGHT: 68 IN

## 2022-10-14 DIAGNOSIS — Z95.1 PRESENCE OF AORTOCORONARY BYPASS GRAFT: Chronic | ICD-10-CM

## 2022-10-14 DIAGNOSIS — Z98.890 OTHER SPECIFIED POSTPROCEDURAL STATES: Chronic | ICD-10-CM

## 2022-10-14 LAB
ALBUMIN SERPL ELPH-MCNC: 4.3 G/DL — SIGNIFICANT CHANGE UP (ref 3.3–5)
ALP SERPL-CCNC: 86 U/L — SIGNIFICANT CHANGE UP (ref 40–120)
ALT FLD-CCNC: 13 U/L — SIGNIFICANT CHANGE UP (ref 10–45)
ANION GAP SERPL CALC-SCNC: 11 MMOL/L — SIGNIFICANT CHANGE UP (ref 5–17)
APTT BLD: 38.3 SEC — HIGH (ref 27.5–35.5)
AST SERPL-CCNC: 18 U/L — SIGNIFICANT CHANGE UP (ref 10–40)
BASOPHILS # BLD AUTO: 0.02 K/UL — SIGNIFICANT CHANGE UP (ref 0–0.2)
BASOPHILS NFR BLD AUTO: 0.3 % — SIGNIFICANT CHANGE UP (ref 0–2)
BILIRUB SERPL-MCNC: 0.2 MG/DL — SIGNIFICANT CHANGE UP (ref 0.2–1.2)
BUN SERPL-MCNC: 30 MG/DL — HIGH (ref 7–23)
CALCIUM SERPL-MCNC: 11 MG/DL — HIGH (ref 8.4–10.5)
CHLORIDE SERPL-SCNC: 106 MMOL/L — SIGNIFICANT CHANGE UP (ref 96–108)
CO2 SERPL-SCNC: 29 MMOL/L — SIGNIFICANT CHANGE UP (ref 22–31)
CREAT SERPL-MCNC: 1.25 MG/DL — SIGNIFICANT CHANGE UP (ref 0.5–1.3)
EGFR: 46 ML/MIN/1.73M2 — LOW
EOSINOPHIL # BLD AUTO: 0.12 K/UL — SIGNIFICANT CHANGE UP (ref 0–0.5)
EOSINOPHIL NFR BLD AUTO: 1.6 % — SIGNIFICANT CHANGE UP (ref 0–6)
GLUCOSE SERPL-MCNC: 122 MG/DL — HIGH (ref 70–99)
HCT VFR BLD CALC: 40.7 % — SIGNIFICANT CHANGE UP (ref 34.5–45)
HGB BLD-MCNC: 12.8 G/DL — SIGNIFICANT CHANGE UP (ref 11.5–15.5)
IMM GRANULOCYTES NFR BLD AUTO: 0.3 % — SIGNIFICANT CHANGE UP (ref 0–0.9)
INR BLD: 1.05 — SIGNIFICANT CHANGE UP (ref 0.88–1.16)
LYMPHOCYTES # BLD AUTO: 3.12 K/UL — SIGNIFICANT CHANGE UP (ref 1–3.3)
LYMPHOCYTES # BLD AUTO: 42.3 % — SIGNIFICANT CHANGE UP (ref 13–44)
MAGNESIUM SERPL-MCNC: 2 MG/DL — SIGNIFICANT CHANGE UP (ref 1.6–2.6)
MCHC RBC-ENTMCNC: 28.5 PG — SIGNIFICANT CHANGE UP (ref 27–34)
MCHC RBC-ENTMCNC: 31.4 GM/DL — LOW (ref 32–36)
MCV RBC AUTO: 90.6 FL — SIGNIFICANT CHANGE UP (ref 80–100)
MONOCYTES # BLD AUTO: 0.78 K/UL — SIGNIFICANT CHANGE UP (ref 0–0.9)
MONOCYTES NFR BLD AUTO: 10.6 % — SIGNIFICANT CHANGE UP (ref 2–14)
NEUTROPHILS # BLD AUTO: 3.31 K/UL — SIGNIFICANT CHANGE UP (ref 1.8–7.4)
NEUTROPHILS NFR BLD AUTO: 44.9 % — SIGNIFICANT CHANGE UP (ref 43–77)
NRBC # BLD: 0 /100 WBCS — SIGNIFICANT CHANGE UP (ref 0–0)
PLATELET # BLD AUTO: 194 K/UL — SIGNIFICANT CHANGE UP (ref 150–400)
POTASSIUM SERPL-MCNC: 3.9 MMOL/L — SIGNIFICANT CHANGE UP (ref 3.5–5.3)
POTASSIUM SERPL-SCNC: 3.9 MMOL/L — SIGNIFICANT CHANGE UP (ref 3.5–5.3)
PROT SERPL-MCNC: 8.3 G/DL — SIGNIFICANT CHANGE UP (ref 6–8.3)
PROTHROM AB SERPL-ACNC: 12.5 SEC — SIGNIFICANT CHANGE UP (ref 10.5–13.4)
RBC # BLD: 4.49 M/UL — SIGNIFICANT CHANGE UP (ref 3.8–5.2)
RBC # FLD: 14.5 % — SIGNIFICANT CHANGE UP (ref 10.3–14.5)
SARS-COV-2 RNA SPEC QL NAA+PROBE: NEGATIVE — SIGNIFICANT CHANGE UP
SODIUM SERPL-SCNC: 146 MMOL/L — HIGH (ref 135–145)
WBC # BLD: 7.37 K/UL — SIGNIFICANT CHANGE UP (ref 3.8–10.5)
WBC # FLD AUTO: 7.37 K/UL — SIGNIFICANT CHANGE UP (ref 3.8–10.5)

## 2022-10-14 PROCEDURE — 80053 COMPREHEN METABOLIC PANEL: CPT

## 2022-10-14 PROCEDURE — G1004: CPT

## 2022-10-14 PROCEDURE — 96375 TX/PRO/DX INJ NEW DRUG ADDON: CPT | Mod: XU

## 2022-10-14 PROCEDURE — 85025 COMPLETE CBC W/AUTO DIFF WBC: CPT

## 2022-10-14 PROCEDURE — 70496 CT ANGIOGRAPHY HEAD: CPT | Mod: 26,MG

## 2022-10-14 PROCEDURE — 36415 COLL VENOUS BLD VENIPUNCTURE: CPT

## 2022-10-14 PROCEDURE — 87635 SARS-COV-2 COVID-19 AMP PRB: CPT

## 2022-10-14 PROCEDURE — 70450 CT HEAD/BRAIN W/O DYE: CPT | Mod: MG

## 2022-10-14 PROCEDURE — 70450 CT HEAD/BRAIN W/O DYE: CPT | Mod: 26,59,MG

## 2022-10-14 PROCEDURE — 85730 THROMBOPLASTIN TIME PARTIAL: CPT

## 2022-10-14 PROCEDURE — 99284 EMERGENCY DEPT VISIT MOD MDM: CPT | Mod: FS

## 2022-10-14 PROCEDURE — 70498 CT ANGIOGRAPHY NECK: CPT | Mod: 26,MG

## 2022-10-14 PROCEDURE — 70498 CT ANGIOGRAPHY NECK: CPT | Mod: MG

## 2022-10-14 PROCEDURE — 99284 EMERGENCY DEPT VISIT MOD MDM: CPT | Mod: 25

## 2022-10-14 PROCEDURE — 96374 THER/PROPH/DIAG INJ IV PUSH: CPT | Mod: XU

## 2022-10-14 PROCEDURE — 85610 PROTHROMBIN TIME: CPT

## 2022-10-14 PROCEDURE — 83735 ASSAY OF MAGNESIUM: CPT

## 2022-10-14 PROCEDURE — 70496 CT ANGIOGRAPHY HEAD: CPT | Mod: MG

## 2022-10-14 RX ORDER — METOCLOPRAMIDE HCL 10 MG
10 TABLET ORAL ONCE
Refills: 0 | Status: COMPLETED | OUTPATIENT
Start: 2022-10-14 | End: 2022-10-14

## 2022-10-14 RX ORDER — ACETAMINOPHEN 500 MG
1000 TABLET ORAL ONCE
Refills: 0 | Status: COMPLETED | OUTPATIENT
Start: 2022-10-14 | End: 2022-10-14

## 2022-10-14 RX ORDER — SODIUM CHLORIDE 9 MG/ML
1000 INJECTION INTRAMUSCULAR; INTRAVENOUS; SUBCUTANEOUS ONCE
Refills: 0 | Status: COMPLETED | OUTPATIENT
Start: 2022-10-14 | End: 2022-10-14

## 2022-10-14 RX ADMIN — Medication 400 MILLIGRAM(S): at 21:31

## 2022-10-14 RX ADMIN — Medication 104 MILLIGRAM(S): at 22:25

## 2022-10-14 RX ADMIN — SODIUM CHLORIDE 1000 MILLILITER(S): 9 INJECTION INTRAMUSCULAR; INTRAVENOUS; SUBCUTANEOUS at 21:31

## 2022-10-14 NOTE — ED ADULT NURSE NOTE - NSIMPLEMENTINTERV_GEN_ALL_ED
Time-based billing (NON-critical care) Implemented All Universal Safety Interventions:  [place] to call system. Call bell, personal items and telephone within reach. Instruct patient to call for assistance. Room bathroom lighting operational. Non-slip footwear when patient is off stretcher. Physically safe environment: no spills, clutter or unnecessary equipment. Stretcher in lowest position, wheels locked, appropriate side rails in place.

## 2022-10-14 NOTE — ED PROVIDER NOTE - MUSCULOSKELETAL, MLM
Spine appears normal, range of motion is not limited, no muscle or joint tenderness. No neck stiffness. No midline cervical spine tenderness. Full ROM neck.

## 2022-10-14 NOTE — ED ADULT NURSE NOTE - OBJECTIVE STATEMENT
patient arrived to the ED c/o headache x 5 days. denies nausea, vomiting, shortness of breath. patient states headache sometimes is on the right side and then sometimes on the left side. denies vision impairment.

## 2022-10-14 NOTE — ED PROVIDER NOTE - CLINICAL SUMMARY MEDICAL DECISION MAKING FREE TEXT BOX
71 y/o F with PMHx HTN, CAD, and DM presents to ED c/o persistent headache x5 days. Pt is afebrile. No focal neuro deficits on exam. Concern for headache as pt reports headache is intense and mostly lateral. Will obtain CT head, CTA head and neck, pain control, and reevaluate.

## 2022-10-14 NOTE — ED PROVIDER NOTE - PATIENT PORTAL LINK FT
You can access the FollowMyHealth Patient Portal offered by [facility] by registering at the following website: [email or web address]. By joining [employer]’s FollowMyHealth portal, you will also be able to view your health information using other applications (apps) compatible with our system.

## 2022-10-14 NOTE — ED PROVIDER NOTE - CARE PROVIDER_API CALL
Edward Sanchez)  Internal Medicine  [facility] [place] [address]  Phone: [phone]  Fax: [phone]  Follow Up Time:

## 2022-10-14 NOTE — ED PROVIDER NOTE - OBJECTIVE STATEMENT
73 y/o F with PMHx HTN, DM, and CAD presents to ED c/o intermittent headache x5 days. Pt states headache is mostly lateral, sometimes on the left and sometimes on the right. Pain is described as a persistent heaviness. She has been taking tylenol which reliefs pain for a short period of time but then returns. Denies vision changes, dizziness, unsteady gait, nausea, vomiting, fever, chills, or head injury. Pt states the first time she had a similar headache was 5 years ago when her dad was diagnosed with dementia. 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 Also denies any cold or flu symptoms, chest pain, SOB, abdominal pain, or any other acute complaints.

## 2022-10-14 NOTE — ED PROVIDER NOTE - ATTENDING APP SHARED VISIT CONTRIBUTION OF CARE
intermittent headache x 5 days. migratory. suspect tension headache.  neuro exam non focal, well appearing.  gradual onset headache unlikely sah, no focal finding to suggest tumor, no fever to suggest infectious cause. pt very worried about serious pathology. ct head done to r/o mass and neg. symptoms improved with meds in ed. to f/u with pmd. notes she is under a lot of stress and not sleeping well caring for family member at home. return precautions discussed

## 2022-10-15 VITALS
TEMPERATURE: 98 F | RESPIRATION RATE: 18 BRPM | SYSTOLIC BLOOD PRESSURE: 153 MMHG | DIASTOLIC BLOOD PRESSURE: 72 MMHG | HEART RATE: 58 BPM | OXYGEN SATURATION: 97 %

## 2022-10-16 DIAGNOSIS — Z20.822 CONTACT WITH AND (SUSPECTED) EXPOSURE TO COVID-19: ICD-10-CM

## 2022-10-16 DIAGNOSIS — Z79.84 LONG TERM (CURRENT) USE OF ORAL HYPOGLYCEMIC DRUGS: ICD-10-CM

## 2022-10-16 DIAGNOSIS — I25.10 ATHEROSCLEROTIC HEART DISEASE OF NATIVE CORONARY ARTERY WITHOUT ANGINA PECTORIS: ICD-10-CM

## 2022-10-16 DIAGNOSIS — Z81.8 FAMILY HISTORY OF OTHER MENTAL AND BEHAVIORAL DISORDERS: ICD-10-CM

## 2022-10-16 DIAGNOSIS — R51.9 HEADACHE, UNSPECIFIED: ICD-10-CM

## 2022-10-16 DIAGNOSIS — Z88.5 ALLERGY STATUS TO NARCOTIC AGENT: ICD-10-CM

## 2022-10-16 DIAGNOSIS — I10 ESSENTIAL (PRIMARY) HYPERTENSION: ICD-10-CM

## 2022-10-16 DIAGNOSIS — Z79.82 LONG TERM (CURRENT) USE OF ASPIRIN: ICD-10-CM

## 2022-10-16 DIAGNOSIS — Z95.1 PRESENCE OF AORTOCORONARY BYPASS GRAFT: ICD-10-CM

## 2022-10-16 DIAGNOSIS — Z88.8 ALLERGY STATUS TO OTHER DRUGS, MEDICAMENTS AND BIOLOGICAL SUBSTANCES STATUS: ICD-10-CM

## 2022-10-16 DIAGNOSIS — E11.9 TYPE 2 DIABETES MELLITUS WITHOUT COMPLICATIONS: ICD-10-CM

## 2022-10-16 DIAGNOSIS — Z91.013 ALLERGY TO SEAFOOD: ICD-10-CM

## 2023-10-29 ENCOUNTER — EMERGENCY (EMERGENCY)
Facility: HOSPITAL | Age: 73
LOS: 1 days | Discharge: HOME | End: 2023-10-29
Admitting: STUDENT IN AN ORGANIZED HEALTH CARE EDUCATION/TRAINING PROGRAM
Payer: MEDICARE

## 2023-10-29 VITALS
OXYGEN SATURATION: 97 % | DIASTOLIC BLOOD PRESSURE: 73 MMHG | TEMPERATURE: 98 F | HEART RATE: 63 BPM | RESPIRATION RATE: 17 BRPM | SYSTOLIC BLOOD PRESSURE: 134 MMHG | HEIGHT: 68 IN | WEIGHT: 195.99 LBS

## 2023-10-29 DIAGNOSIS — Z95.1 PRESENCE OF AORTOCORONARY BYPASS GRAFT: Chronic | ICD-10-CM

## 2023-10-29 DIAGNOSIS — Z98.890 OTHER SPECIFIED POSTPROCEDURAL STATES: Chronic | ICD-10-CM

## 2023-10-29 PROCEDURE — 93010 ELECTROCARDIOGRAM REPORT: CPT

## 2023-10-29 PROCEDURE — 96372 THER/PROPH/DIAG INJ SC/IM: CPT

## 2023-10-29 PROCEDURE — 71046 X-RAY EXAM CHEST 2 VIEWS: CPT

## 2023-10-29 PROCEDURE — 99284 EMERGENCY DEPT VISIT MOD MDM: CPT

## 2023-10-29 PROCEDURE — 93005 ELECTROCARDIOGRAM TRACING: CPT

## 2023-10-29 PROCEDURE — 71046 X-RAY EXAM CHEST 2 VIEWS: CPT | Mod: 26

## 2023-10-29 PROCEDURE — 99283 EMERGENCY DEPT VISIT LOW MDM: CPT | Mod: 25

## 2023-10-29 RX ORDER — METHOCARBAMOL 500 MG/1
1 TABLET, FILM COATED ORAL
Qty: 8 | Refills: 0
Start: 2023-10-29 | End: 2023-11-01

## 2023-10-29 RX ORDER — METHOCARBAMOL 500 MG/1
1 TABLET, FILM COATED ORAL
Qty: 10 | Refills: 0
Start: 2023-10-29 | End: 2023-11-02

## 2023-10-29 RX ORDER — LIDOCAINE 4 G/100G
1 CREAM TOPICAL
Refills: 0
Start: 2023-10-29

## 2023-10-29 RX ORDER — MENTHOL 16 G/100G
1 CREAM TOPICAL
Qty: 1 | Refills: 0
Start: 2023-10-29 | End: 2023-10-31

## 2023-10-29 RX ORDER — ACETAMINOPHEN 500 MG
650 TABLET ORAL ONCE
Refills: 0 | Status: COMPLETED | OUTPATIENT
Start: 2023-10-29 | End: 2023-10-29

## 2023-10-29 RX ORDER — KETOROLAC TROMETHAMINE 30 MG/ML
15 SYRINGE (ML) INJECTION ONCE
Refills: 0 | Status: DISCONTINUED | OUTPATIENT
Start: 2023-10-29 | End: 2023-10-29

## 2023-10-29 RX ORDER — METHOCARBAMOL 500 MG/1
500 TABLET, FILM COATED ORAL ONCE
Refills: 0 | Status: COMPLETED | OUTPATIENT
Start: 2023-10-29 | End: 2023-10-29

## 2023-10-29 RX ADMIN — Medication 15 MILLIGRAM(S): at 15:23

## 2023-10-29 RX ADMIN — METHOCARBAMOL 500 MILLIGRAM(S): 500 TABLET, FILM COATED ORAL at 16:37

## 2023-10-29 RX ADMIN — Medication 650 MILLIGRAM(S): at 16:37

## 2023-10-29 NOTE — ED PROVIDER NOTE - MUSCULOSKELETAL, MLM
Spine appears normal, range of motion is not limited, pain is reproducaibale at the right upper paraspinal muscles along the rhomboid, tender to touch. No signs of acute skin infection or swelling. UE muscle strength 5/5 Spine appears normal, range of motion is not limited, pain is reproducible at the right upper paraspinal muscles along the right rhomboid, tender to touch. No signs of acute skin infection or swelling. UE muscle strength 5/5

## 2023-10-29 NOTE — ED ADULT NURSE NOTE - NSFALLUNIVINTERV_ED_ALL_ED
Bed/Stretcher in lowest position, wheels locked, appropriate side rails in place/Call bell, personal items and telephone in reach/Instruct patient to call for assistance before getting out of bed/chair/stretcher/Non-slip footwear applied when patient is off stretcher/[place] to call system/Physically safe environment - no spills, clutter or unnecessary equipment/Purposeful proactive rounding/Room/bathroom lighting operational, light cord in reach

## 2023-10-29 NOTE — ED PROVIDER NOTE - NSFOLLOWUPINSTRUCTIONS_ED_ALL_ED_FT
Recommend ice/ heat 20 mins on and off every 3-4 times a day. Follow up with PMD this week for re evaluation. Return to ED if condition worsen.

## 2023-10-29 NOTE — ED ADULT NURSE NOTE - OBJECTIVE STATEMENT
Pt presents to the ED with back pain x 3 weeks. Pt presents to the ED with back pain x 3 weeks. Pain described as constant, with some relief from Tylenol, sometimes sharp other times dull, stretching from upper R shoulder down to lower R back. Pt also reports R chest pressure, no SOB, dizziness, n/v/d, fever or chills. 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 No deformity noted to area, pulses present, skin is warm. pt denies paresthesias or numbness to affected extremity. EKG in progress.

## 2023-10-29 NOTE — ED PROVIDER NOTE - OBJECTIVE STATEMENT
74 y/o F with a PMH of CAD, HTN, DM Type 2, chronic lower back pain presents to the ED c/o right upper back pain more to the scapula which is worse with movement and tends to radiate to her axillary. Patient denies any CP, SOB, numbness, tingling or weakness to her UE. Patient reports that her PMD send her an anti-inflammatory muscle relaxer x1 week ago which helped improve her symptoms. However, patient reports that the pain has returned. Patient denies any abdominal pain, urinary or bowel incontinence. 74 y/o F with a PMH of CAD, HTN, DM Type 2, chronic lower back pain presents to the ED c/o right upper back pain closest  to the scapula which is worse with movement. She reports pain tends to radiate to her axillary. Patient denies any CP, SOB, numbness, tingling or weakness to her UE. Patient reports that her PMD send her an anti-inflammatory muscle relaxer x1 week ago which helped improve her symptoms. However, patient reports that the pain has returned. Patient denies any abdominal pain, urinary or bowel incontinence.

## 2023-10-29 NOTE — ED PROVIDER NOTE - NSICDXPASTMEDICALHX_GEN_ALL_CORE_FT
PAST MEDICAL HISTORY:  Coronary artery disease involving other coronary artery bypass graft with angina pectoris     Diabetes type 2, controlled     HTN (hypertension), benign       Chronic lower back pain

## 2023-10-29 NOTE — ED ADULT TRIAGE NOTE - CHIEF COMPLAINT QUOTE
Pt c/o lower back pain x 3 weeks. Denies numbness/tingling, urinary symptoms, loss of bowel/bladder control.

## 2023-10-29 NOTE — ED PROVIDER NOTE - CLINICAL SUMMARY MEDICAL DECISION MAKING FREE TEXT BOX
72 y/o f with HTN , CAD present to ED with reproducible right sided rhomboid muscle pain worse with movement. EKG and cxr No significant findings. Responded well to meds in ED. Recommend f/u with PMD.

## 2023-10-29 NOTE — ED PROVIDER NOTE - PATIENT PORTAL LINK FT
You can access the FollowMyHealth Patient Portal offered by [facility] by registering at the following website: [email or web address]. By joining [employer]’s FollowMyHealth portal, you will also be able to view your health information using other applications (apps) compatible with our system.

## 2023-11-02 DIAGNOSIS — J98.11 ATELECTASIS: ICD-10-CM

## 2023-11-02 DIAGNOSIS — M54.50 LOW BACK PAIN, UNSPECIFIED: ICD-10-CM

## 2023-11-02 DIAGNOSIS — Z95.1 PRESENCE OF AORTOCORONARY BYPASS GRAFT: ICD-10-CM

## 2023-11-02 DIAGNOSIS — M89.512 OSTEOLYSIS, LEFT SHOULDER: ICD-10-CM

## 2023-11-02 DIAGNOSIS — Z87.39 PERSONAL HISTORY OF OTHER DISEASES OF THE MUSCULOSKELETAL SYSTEM AND CONNECTIVE TISSUE: ICD-10-CM

## 2023-11-02 DIAGNOSIS — E11.9 TYPE 2 DIABETES MELLITUS WITHOUT COMPLICATIONS: ICD-10-CM

## 2023-11-02 DIAGNOSIS — Z79.82 LONG TERM (CURRENT) USE OF ASPIRIN: ICD-10-CM

## 2023-11-02 DIAGNOSIS — Z88.8 ALLERGY STATUS TO OTHER DRUGS, MEDICAMENTS AND BIOLOGICAL SUBSTANCES: ICD-10-CM

## 2023-11-02 DIAGNOSIS — Z79.84 LONG TERM (CURRENT) USE OF ORAL HYPOGLYCEMIC DRUGS: ICD-10-CM

## 2023-11-02 DIAGNOSIS — G89.29 OTHER CHRONIC PAIN: ICD-10-CM

## 2023-11-02 DIAGNOSIS — Z88.5 ALLERGY STATUS TO NARCOTIC AGENT: ICD-10-CM

## 2023-11-02 DIAGNOSIS — I25.709 ATHEROSCLEROSIS OF CORONARY ARTERY BYPASS GRAFT(S), UNSPECIFIED, WITH UNSPECIFIED ANGINA PECTORIS: ICD-10-CM

## 2023-11-02 DIAGNOSIS — M41.9 SCOLIOSIS, UNSPECIFIED: ICD-10-CM

## 2023-11-02 DIAGNOSIS — I10 ESSENTIAL (PRIMARY) HYPERTENSION: ICD-10-CM

## 2023-11-02 DIAGNOSIS — Z91.013 ALLERGY TO SEAFOOD: ICD-10-CM

## 2023-11-02 DIAGNOSIS — I70.0 ATHEROSCLEROSIS OF AORTA: ICD-10-CM

## 2023-11-02 DIAGNOSIS — M77.8 OTHER ENTHESOPATHIES, NOT ELSEWHERE CLASSIFIED: ICD-10-CM

## 2023-11-02 DIAGNOSIS — M54.9 DORSALGIA, UNSPECIFIED: ICD-10-CM

## 2024-01-13 NOTE — ED ADULT TRIAGE NOTE - ADDITIONAL SAFETY/BANDS...
"Nuris Squires is a 61 y.o. female on day 2 of admission presenting with Right foot infection.    Subjective   Patient resting in bed. Denies constitutional symptoms at this time or pain in he operative limb.         Physical Exam    Objective     General: Patient seen resting in bed. In NAD with dressing intact to right foot without strikethrough.     Objective:     Vasc: DP and PT pulses palpable b/l. CFT is less than 3 seconds bilateral.  Skin temperature is warm to cool proximal to distal bilateral. Focal increase in temperature to right dorsal foot.      Derm: Dressing left intact at today's visit    Neuro:  Light touch absent to the foot bilateral.      Ortho: Muscle strength is 5/5 for all pedal groups tested. 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 No pain with palpation of b/l lower extremities.      Last Recorded Vitals  Blood pressure 152/71, pulse 76, temperature 36.8 °C (98.2 °F), temperature source Oral, resp. rate 16, height 1.575 m (5' 2\"), weight 102 kg (225 lb), SpO2 100 %.    Intake/Output last 3 Shifts:  I/O last 3 completed shifts:  In: 1044.7 (10.2 mL/kg) [I.V.:894.7 (8.8 mL/kg); IV Piggyback:150]  Out: - (0 mL/kg)   Weight: 102.1 kg     Relevant Results  Scheduled medications  aspirin, 81 mg, oral, Daily  atorvastatin, 40 mg, oral, Nightly  cefepime, 1 g, intravenous, q24h  cholecalciferol, 2,000 Units, oral, Daily  dicyclomine, 10 mg, oral, 4x daily  estradiol, 2 g, vaginal, Nightly  gabapentin, 300 mg, oral, BID  heparin, 5,000 Units, subcutaneous, q8h  insulin glargine, 30 Units, subcutaneous, Nightly  insulin lispro, 0-15 Units, subcutaneous, TID with meals  linezolid, 600 mg, oral, q12h  [Held by provider] magnesium oxide, 400 mg, oral, Daily  multivitamin with minerals, 1 tablet, oral, Daily  pantoprazole, 40 mg, oral, Daily before breakfast  sennosides-docusate sodium, 2 tablet, oral, Nightly      Continuous medications  sodium bicarbonate 75 mEq, 80 mL/hr, Last " Rate: 80 mL/hr (01/13/24 0940)      PRN medications  PRN medications: acetaminophen **OR** acetaminophen **OR** acetaminophen, benzocaine-menthol, dextromethorphan-guaifenesin, dextrose 10 % in water (D10W), dextrose, glucagon, guaiFENesin, meclizine, ondansetron ODT, polyethylene glycol   Results for orders placed or performed during the hospital encounter of 01/09/24 (from the past 24 hour(s))   Tissue/Wound Culture/Smear    Specimen: DIGIT FIRST, RIGHT FOOT; Tissue   Result Value Ref Range    Gram Stain No polymorphonuclear leukocytes seen     Gram Stain No organisms seen    Tissue/Wound Culture/Smear    Specimen: DIGIT FIRST, RIGHT FOOT; Tissue   Result Value Ref Range    Tissue/Wound Culture/Smear No growth to date     Gram Stain No organisms seen     Gram Stain (1+) Rare Polymorphonuclear leukocytes    POCT GLUCOSE   Result Value Ref Range    POCT Glucose 233 (H) 74 - 99 mg/dL   POCT GLUCOSE   Result Value Ref Range    POCT Glucose 230 (H) 74 - 99 mg/dL   Renal Function Panel   Result Value Ref Range    Glucose 277 (H) 65 - 99 mg/dL    Sodium 132 (L) 133 - 145 mmol/L    Potassium 3.7 3.4 - 5.1 mmol/L    Chloride 98 97 - 107 mmol/L    Bicarbonate 17 (L) 24 - 31 mmol/L    Urea Nitrogen 30 (H) 8 - 25 mg/dL    Creatinine 5.30 (H) 0.40 - 1.60 mg/dL    eGFR 9 (L) >60 mL/min/1.73m*2    Calcium 8.0 (L) 8.5 - 10.4 mg/dL    Phosphorus 5.2 (H) 2.5 - 4.5 mg/dL    Albumin 2.4 (L) 3.5 - 5.0 g/dL    Anion Gap 17 <=19 mmol/L   CBC   Result Value Ref Range    WBC 9.7 4.4 - 11.3 x10*3/uL    nRBC 0.0 0.0 - 0.0 /100 WBCs    RBC 3.21 (L) 4.00 - 5.20 x10*6/uL    Hemoglobin 9.5 (L) 12.0 - 16.0 g/dL    Hematocrit 29.4 (L) 36.0 - 46.0 %    MCV 92 80 - 100 fL    MCH 29.6 26.0 - 34.0 pg    MCHC 32.3 32.0 - 36.0 g/dL    RDW 12.3 11.5 - 14.5 %    Platelets 424 150 - 450 x10*3/uL   POCT GLUCOSE   Result Value Ref Range    POCT Glucose 210 (H) 74 - 99 mg/dL   POCT GLUCOSE   Result Value Ref Range    POCT Glucose 124 (H) 74 - 99 mg/dL      Susceptibility data from last 90 days.  Collected Specimen Info Organism Clindamycin Erythromycin Oxacillin Tetracycline Trimethoprim/Sulfamethoxazole Vancomycin   01/10/24 Tissue/Biopsy from Diabetic Foot Ulcer Methicillin Susceptible Staphylococcus aureus (MSSA) R R S S S S     Candida glabrata           Mixed Gram-Positive Bacteria          01/09/24 Urine from Clean Catch/Voided Streptococcus agalactiae (Group B Streptococcus)                   Assessment/Plan   Principal Problem:    Right foot infection    Cellulitis right foot  Diabetes mellitus, poorly controled with foot ulceration  Hx of digital amputations left foot  Hx of Charcot Neuroarthropathy     Plan:     - Patient was seen and evaluated; all findings were discussed and all questions were answered to patient's satisfaction.  - Charts, labs, vitals and imaging all reviewed.   - Imaging: X-ray per radiology with no significant acute findings suggestive of osteomyelitis, crepitus, or air. CT reviewed with no signs of abscess.     - POD 1 from hallux amputation and midfoot bone biopsy, doing well without constitutional symptoms at this time  - Will follow intraoperative specimen.  -PVR/ALESIA reviewed with triphasic flow b/l  - Dressing left intact, to be changed tomorrow    Pain regimen: Per Primary  ABX: Per infectious disease    - Weightbearing Status: Flat foot or heel weightbearing right foot with surgical shoe     - Podiatry will continue to follow while in house.     Bk Whittington DPM PGY3          Bk Whittington DPM       Additional Safety/Bands: